# Patient Record
Sex: MALE | Race: WHITE | Employment: FULL TIME | URBAN - METROPOLITAN AREA
[De-identification: names, ages, dates, MRNs, and addresses within clinical notes are randomized per-mention and may not be internally consistent; named-entity substitution may affect disease eponyms.]

---

## 2018-12-06 ENCOUNTER — HOSPITAL ENCOUNTER (OUTPATIENT)
Age: 53
Discharge: HOME | End: 2018-12-06
Payer: COMMERCIAL

## 2018-12-06 LAB
ALBUMIN SERPL-MCNC: 4.2 G/DL (ref 3.5–5.2)
ALP BLD-CCNC: 82 U/L (ref 40–129)
ALT SERPL-CCNC: 21 U/L (ref 0–40)
ANION GAP SERPL CALCULATED.3IONS-SCNC: 11 MMOL/L (ref 7–16)
AST SERPL-CCNC: 35 U/L (ref 0–39)
BASOPHILS ABSOLUTE: 0.06 E9/L (ref 0–0.2)
BASOPHILS RELATIVE PERCENT: 0.9 % (ref 0–2)
BILIRUB SERPL-MCNC: 0.6 MG/DL (ref 0–1.2)
BILIRUBIN DIRECT: <0.2 MG/DL (ref 0–0.3)
BILIRUBIN, INDIRECT: NORMAL MG/DL (ref 0–1)
BUN BLDV-MCNC: 18 MG/DL (ref 6–20)
CALCIUM SERPL-MCNC: 9.5 MG/DL (ref 8.6–10.2)
CHLORIDE BLD-SCNC: 100 MMOL/L (ref 98–107)
CHOLESTEROL, FASTING: 184 MG/DL (ref 0–199)
CO2: 26 MMOL/L (ref 22–29)
CREAT SERPL-MCNC: 0.9 MG/DL (ref 0.7–1.2)
EOSINOPHILS ABSOLUTE: 0.3 E9/L (ref 0.05–0.5)
EOSINOPHILS RELATIVE PERCENT: 4.4 % (ref 0–6)
GFR AFRICAN AMERICAN: >60
GFR NON-AFRICAN AMERICAN: >60 ML/MIN/1.73
GLUCOSE FASTING: 97 MG/DL (ref 74–99)
HCT VFR BLD CALC: 44.3 % (ref 37–54)
HDLC SERPL-MCNC: 48 MG/DL
HEMOGLOBIN: 14.2 G/DL (ref 12.5–16.5)
IMMATURE GRANULOCYTES #: 0.02 E9/L
IMMATURE GRANULOCYTES %: 0.3 % (ref 0–5)
LDL CHOLESTEROL CALCULATED: 120 MG/DL (ref 0–99)
LYMPHOCYTES ABSOLUTE: 2.23 E9/L (ref 1.5–4)
LYMPHOCYTES RELATIVE PERCENT: 32.8 % (ref 20–42)
MCH RBC QN AUTO: 30.4 PG (ref 26–35)
MCHC RBC AUTO-ENTMCNC: 32.1 % (ref 32–34.5)
MCV RBC AUTO: 94.9 FL (ref 80–99.9)
MONOCYTES ABSOLUTE: 0.56 E9/L (ref 0.1–0.95)
MONOCYTES RELATIVE PERCENT: 8.2 % (ref 2–12)
NEUTROPHILS ABSOLUTE: 3.63 E9/L (ref 1.8–7.3)
NEUTROPHILS RELATIVE PERCENT: 53.4 % (ref 43–80)
PDW BLD-RTO: 12.5 FL (ref 11.5–15)
PLATELET # BLD: 276 E9/L (ref 130–450)
PMV BLD AUTO: 11.1 FL (ref 7–12)
POTASSIUM SERPL-SCNC: 5.2 MMOL/L (ref 3.5–5)
PROSTATE SPECIFIC ANTIGEN: 0.61 NG/ML (ref 0–4)
RBC # BLD: 4.67 E12/L (ref 3.8–5.8)
RHEUMATOID FACTOR: <10 IU/ML (ref 0–13)
SODIUM BLD-SCNC: 137 MMOL/L (ref 132–146)
TOTAL PROTEIN: 7.5 G/DL (ref 6.4–8.3)
TRIGLYCERIDE, FASTING: 79 MG/DL (ref 0–149)
TSH SERPL DL<=0.05 MIU/L-ACNC: 2.85 UIU/ML (ref 0.27–4.2)
URIC ACID, SERUM: 4.9 MG/DL (ref 3.4–7)
VLDLC SERPL CALC-MCNC: 16 MG/DL
WBC # BLD: 6.8 E9/L (ref 4.5–11.5)

## 2018-12-06 PROCEDURE — 84443 ASSAY THYROID STIM HORMONE: CPT

## 2018-12-06 PROCEDURE — 80061 LIPID PANEL: CPT

## 2018-12-06 PROCEDURE — 86431 RHEUMATOID FACTOR QUANT: CPT

## 2018-12-06 PROCEDURE — 36415 COLL VENOUS BLD VENIPUNCTURE: CPT

## 2018-12-06 PROCEDURE — G0103 PSA SCREENING: HCPCS

## 2018-12-06 PROCEDURE — 86038 ANTINUCLEAR ANTIBODIES: CPT

## 2018-12-06 PROCEDURE — 84550 ASSAY OF BLOOD/URIC ACID: CPT

## 2018-12-06 PROCEDURE — 80076 HEPATIC FUNCTION PANEL: CPT

## 2018-12-06 PROCEDURE — 85025 COMPLETE CBC W/AUTO DIFF WBC: CPT

## 2018-12-06 PROCEDURE — 80053 COMPREHEN METABOLIC PANEL: CPT

## 2018-12-07 LAB — ANTI-NUCLEAR ANTIBODY (ANA): NEGATIVE

## 2018-12-12 ENCOUNTER — TELEPHONE (OUTPATIENT)
Dept: ADMINISTRATIVE | Age: 53
End: 2018-12-12

## 2019-01-25 ENCOUNTER — OFFICE VISIT (OUTPATIENT)
Dept: CARDIOLOGY CLINIC | Age: 54
End: 2019-01-25
Payer: COMMERCIAL

## 2019-01-25 VITALS
HEIGHT: 70 IN | HEART RATE: 59 BPM | DIASTOLIC BLOOD PRESSURE: 70 MMHG | WEIGHT: 204 LBS | BODY MASS INDEX: 29.2 KG/M2 | RESPIRATION RATE: 16 BRPM | SYSTOLIC BLOOD PRESSURE: 112 MMHG

## 2019-01-25 DIAGNOSIS — R06.00 DYSPNEA, UNSPECIFIED TYPE: Primary | ICD-10-CM

## 2019-01-25 PROCEDURE — 99242 OFF/OP CONSLTJ NEW/EST SF 20: CPT | Performed by: INTERNAL MEDICINE

## 2019-01-25 PROCEDURE — 93000 ELECTROCARDIOGRAM COMPLETE: CPT | Performed by: INTERNAL MEDICINE

## 2019-01-25 RX ORDER — AZELASTINE 1 MG/ML
SPRAY, METERED NASAL
Refills: 0 | COMMUNITY
Start: 2018-12-07

## 2019-01-25 RX ORDER — ENALAPRIL MALEATE 5 MG/1
TABLET ORAL
Refills: 0 | COMMUNITY
Start: 2018-12-07

## 2019-01-25 RX ORDER — ALBUTEROL SULFATE 90 UG/1
2 AEROSOL, METERED RESPIRATORY (INHALATION) EVERY 6 HOURS PRN
COMMUNITY

## 2019-01-25 RX ORDER — CETIRIZINE HYDROCHLORIDE 10 MG/1
10 TABLET ORAL DAILY
COMMUNITY

## 2019-02-08 ENCOUNTER — HOSPITAL ENCOUNTER (OUTPATIENT)
Dept: CARDIOLOGY | Age: 54
Discharge: HOME | End: 2019-02-08
Payer: COMMERCIAL

## 2019-02-08 VITALS
HEIGHT: 71 IN | OXYGEN SATURATION: 97 % | SYSTOLIC BLOOD PRESSURE: 120 MMHG | HEART RATE: 79 BPM | BODY MASS INDEX: 28.42 KG/M2 | DIASTOLIC BLOOD PRESSURE: 70 MMHG | WEIGHT: 203 LBS

## 2019-02-08 DIAGNOSIS — R06.00 DYSPNEA, UNSPECIFIED TYPE: ICD-10-CM

## 2019-02-08 PROCEDURE — 93017 CV STRESS TEST TRACING ONLY: CPT

## 2019-02-13 ENCOUNTER — TELEPHONE (OUTPATIENT)
Dept: CARDIOLOGY CLINIC | Age: 54
End: 2019-02-13

## 2019-12-09 ENCOUNTER — HOSPITAL ENCOUNTER (OUTPATIENT)
Age: 54
Discharge: HOME | End: 2019-12-09
Payer: COMMERCIAL

## 2019-12-09 LAB
ALBUMIN SERPL-MCNC: 4.3 G/DL (ref 3.5–5.2)
ALP BLD-CCNC: 90 U/L (ref 40–129)
ALT SERPL-CCNC: 17 U/L (ref 0–40)
ANION GAP SERPL CALCULATED.3IONS-SCNC: 11 MMOL/L (ref 7–16)
AST SERPL-CCNC: 25 U/L (ref 0–39)
BASOPHILS ABSOLUTE: 0.04 E9/L (ref 0–0.2)
BASOPHILS RELATIVE PERCENT: 0.5 % (ref 0–2)
BILIRUB SERPL-MCNC: 0.5 MG/DL (ref 0–1.2)
BILIRUBIN DIRECT: <0.2 MG/DL (ref 0–0.3)
BILIRUBIN, INDIRECT: NORMAL MG/DL (ref 0–1)
BUN BLDV-MCNC: 12 MG/DL (ref 6–20)
CALCIUM SERPL-MCNC: 9.6 MG/DL (ref 8.6–10.2)
CHLORIDE BLD-SCNC: 98 MMOL/L (ref 98–107)
CHOLESTEROL, TOTAL: 193 MG/DL (ref 0–199)
CO2: 26 MMOL/L (ref 22–29)
CREAT SERPL-MCNC: 1 MG/DL (ref 0.7–1.2)
EOSINOPHILS ABSOLUTE: 0.12 E9/L (ref 0.05–0.5)
EOSINOPHILS RELATIVE PERCENT: 1.4 % (ref 0–6)
GFR AFRICAN AMERICAN: >60
GFR NON-AFRICAN AMERICAN: >60 ML/MIN/1.73
GLUCOSE BLD-MCNC: 96 MG/DL (ref 74–99)
HCT VFR BLD CALC: 47.3 % (ref 37–54)
HDLC SERPL-MCNC: 55 MG/DL
HEMOGLOBIN: 15 G/DL (ref 12.5–16.5)
IMMATURE GRANULOCYTES #: 0.03 E9/L
IMMATURE GRANULOCYTES %: 0.3 % (ref 0–5)
LDL CHOLESTEROL CALCULATED: 115 MG/DL (ref 0–99)
LYMPHOCYTES ABSOLUTE: 2.34 E9/L (ref 1.5–4)
LYMPHOCYTES RELATIVE PERCENT: 26.4 % (ref 20–42)
MCH RBC QN AUTO: 30.2 PG (ref 26–35)
MCHC RBC AUTO-ENTMCNC: 31.7 % (ref 32–34.5)
MCV RBC AUTO: 95.4 FL (ref 80–99.9)
MONOCYTES ABSOLUTE: 0.71 E9/L (ref 0.1–0.95)
MONOCYTES RELATIVE PERCENT: 8 % (ref 2–12)
NEUTROPHILS ABSOLUTE: 5.63 E9/L (ref 1.8–7.3)
NEUTROPHILS RELATIVE PERCENT: 63.4 % (ref 43–80)
PDW BLD-RTO: 12.6 FL (ref 11.5–15)
PLATELET # BLD: 255 E9/L (ref 130–450)
PMV BLD AUTO: 10 FL (ref 7–12)
POTASSIUM SERPL-SCNC: 4.4 MMOL/L (ref 3.5–5)
PROSTATE SPECIFIC ANTIGEN: 0.59 NG/ML (ref 0–4)
RBC # BLD: 4.96 E12/L (ref 3.8–5.8)
SODIUM BLD-SCNC: 135 MMOL/L (ref 132–146)
TOTAL PROTEIN: 7.9 G/DL (ref 6.4–8.3)
TRIGL SERPL-MCNC: 115 MG/DL (ref 0–149)
TSH SERPL DL<=0.05 MIU/L-ACNC: 2.62 UIU/ML (ref 0.27–4.2)
VLDLC SERPL CALC-MCNC: 23 MG/DL
WBC # BLD: 8.9 E9/L (ref 4.5–11.5)

## 2019-12-09 PROCEDURE — 82248 BILIRUBIN DIRECT: CPT

## 2019-12-09 PROCEDURE — 85025 COMPLETE CBC W/AUTO DIFF WBC: CPT

## 2019-12-09 PROCEDURE — G0103 PSA SCREENING: HCPCS

## 2019-12-09 PROCEDURE — 36415 COLL VENOUS BLD VENIPUNCTURE: CPT

## 2019-12-09 PROCEDURE — 80061 LIPID PANEL: CPT

## 2019-12-09 PROCEDURE — 80053 COMPREHEN METABOLIC PANEL: CPT

## 2019-12-09 PROCEDURE — 84443 ASSAY THYROID STIM HORMONE: CPT

## 2021-08-04 ENCOUNTER — HOSPITAL ENCOUNTER (OUTPATIENT)
Age: 56
Discharge: HOME | End: 2021-08-04
Payer: COMMERCIAL

## 2021-08-04 LAB
ALBUMIN SERPL-MCNC: 4.2 G/DL (ref 3.5–5.2)
ALP BLD-CCNC: 118 U/L (ref 40–129)
ALT SERPL-CCNC: 21 U/L (ref 0–40)
ANION GAP SERPL CALCULATED.3IONS-SCNC: 8 MMOL/L (ref 7–16)
AST SERPL-CCNC: 23 U/L (ref 0–39)
BILIRUB SERPL-MCNC: 0.6 MG/DL (ref 0–1.2)
BUN BLDV-MCNC: 15 MG/DL (ref 6–20)
CALCIUM SERPL-MCNC: 9.8 MG/DL (ref 8.6–10.2)
CHLORIDE BLD-SCNC: 98 MMOL/L (ref 98–107)
CHOLESTEROL, FASTING: 195 MG/DL (ref 0–199)
CO2: 28 MMOL/L (ref 22–29)
CREAT SERPL-MCNC: 1.3 MG/DL (ref 0.7–1.2)
GFR AFRICAN AMERICAN: >60
GFR NON-AFRICAN AMERICAN: 57 ML/MIN/1.73
GLUCOSE BLD-MCNC: 84 MG/DL (ref 74–99)
HCT VFR BLD CALC: 45.4 % (ref 37–54)
HDLC SERPL-MCNC: 59 MG/DL
HEMOGLOBIN: 14.4 G/DL (ref 12.5–16.5)
LDL CHOLESTEROL CALCULATED: 119 MG/DL (ref 0–99)
MCH RBC QN AUTO: 30.8 PG (ref 26–35)
MCHC RBC AUTO-ENTMCNC: 31.7 % (ref 32–34.5)
MCV RBC AUTO: 97 FL (ref 80–99.9)
PDW BLD-RTO: 13.3 FL (ref 11.5–15)
PLATELET # BLD: 231 E9/L (ref 130–450)
PMV BLD AUTO: 9.9 FL (ref 7–12)
POTASSIUM SERPL-SCNC: 5 MMOL/L (ref 3.5–5)
RBC # BLD: 4.68 E12/L (ref 3.8–5.8)
SODIUM BLD-SCNC: 134 MMOL/L (ref 132–146)
TOTAL PROTEIN: 7.8 G/DL (ref 6.4–8.3)
TRIGLYCERIDE, FASTING: 83 MG/DL (ref 0–149)
VLDLC SERPL CALC-MCNC: 17 MG/DL
WBC # BLD: 8.1 E9/L (ref 4.5–11.5)

## 2021-08-04 PROCEDURE — 80053 COMPREHEN METABOLIC PANEL: CPT

## 2021-08-04 PROCEDURE — 36415 COLL VENOUS BLD VENIPUNCTURE: CPT

## 2021-08-04 PROCEDURE — 85027 COMPLETE CBC AUTOMATED: CPT

## 2021-08-04 PROCEDURE — G0103 PSA SCREENING: HCPCS

## 2021-08-04 PROCEDURE — 80061 LIPID PANEL: CPT

## 2021-08-04 PROCEDURE — 84153 ASSAY OF PSA TOTAL: CPT

## 2021-08-12 LAB
Lab: NORMAL
REPORT: NORMAL
THIS TEST SENT TO: NORMAL

## 2021-09-28 ENCOUNTER — HOSPITAL ENCOUNTER (OUTPATIENT)
Age: 56
Discharge: HOME | End: 2021-09-30
Payer: COMMERCIAL

## 2021-09-28 ENCOUNTER — HOSPITAL ENCOUNTER (OUTPATIENT)
Dept: GENERAL RADIOLOGY | Age: 56
Discharge: HOME | End: 2021-09-30
Payer: COMMERCIAL

## 2021-09-28 DIAGNOSIS — R52 PAIN: ICD-10-CM

## 2021-09-28 PROCEDURE — 72070 X-RAY EXAM THORAC SPINE 2VWS: CPT

## 2022-07-02 ENCOUNTER — HOSPITAL ENCOUNTER (EMERGENCY)
Age: 57
Discharge: HOME | End: 2022-07-02
Payer: COMMERCIAL

## 2022-07-02 VITALS
TEMPERATURE: 97.6 F | OXYGEN SATURATION: 100 % | BODY MASS INDEX: 28.7 KG/M2 | DIASTOLIC BLOOD PRESSURE: 100 MMHG | WEIGHT: 205 LBS | RESPIRATION RATE: 14 BRPM | HEIGHT: 71 IN | SYSTOLIC BLOOD PRESSURE: 127 MMHG | HEART RATE: 56 BPM
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
## 2022-07-02 DIAGNOSIS — S10.96XA TICK BITE OF NECK, INITIAL ENCOUNTER: Primary | ICD-10-CM

## 2022-07-02 PROCEDURE — 99283 EMERGENCY DEPT VISIT LOW MDM: CPT

## 2022-07-02 PROCEDURE — 6370000000 HC RX 637 (ALT 250 FOR IP): Performed by: PHYSICIAN ASSISTANT

## 2022-07-02 RX ORDER — DOXYCYCLINE HYCLATE 100 MG/1
200 CAPSULE ORAL ONCE
Status: COMPLETED | OUTPATIENT
Start: 2022-07-02 | End: 2022-07-02

## 2022-07-02 RX ADMIN — DOXYCYCLINE HYCLATE 200 MG: 100 CAPSULE ORAL at 23:37

## 2022-07-03 NOTE — ED PROVIDER NOTES
[address] [place] [address]  Department of Emergency Medicine   ED  Encounter Note  Admit Date/RoomTime: 2022 11:24 PM  ED Room:   NAME: Jesús Mckeon  : 1965  MRN: [id number]     Chief Complaint:  Tick Removal (patient states that he pulled tick out of beard)    [address] [facility] Conchis Hess is a 62 y.o. male who presents to the ED by private vehicle for tick removed from neck, beginning a couple hours ago. The complaint has been stable and are mild in severity. He reports the tick was not there last night. He has no pain at the site. Pt has tick with him. It is a dog tick. ROS   Pertinent positives and negatives are stated within HPI, all other systems reviewed and are negative. Past Medical History:  has a past medical history of Asthma and Hypertension. Surgical History:  has a past surgical history that includes [place] tooth extraction. Social History:  reports that he has never smoked. He has never used smokeless tobacco. He reports that he does not drink alcohol and does not use drugs. Family History: family history includes Diabetes (age of onset: 66) in his father; Heart Disease (age of onset: 48) in his maternal uncle; Other in his father; Other (age of onset: 80) in his mother. Allergies: Seasonal    PHYSICAL EXAM   Oxygen Saturation Interpretation: Normal on room air analysis. ED Triage Vitals [22 2254]   BP Temp Temp Source Heart Rate Resp SpO2 Height Weight   (!) 127/100 97.6 °F (36.4 °C) Tympanic 56 14 100 % 5' 11\" (1.803 m) 205 lb (93 kg)         Physical Exam  Constitutional/General: Alert and oriented x3, well appearing, non toxic  HEENT:  NC/NT. PERRLA,  Airway patent. Neck: Supple, full ROM, non tender to palpation in the midline, site of tick bite is a 2 mm red spot, no swelling, drainage, or pain with palpation. It is located where under side of jaw meets neck, midline.   Musculoskeletal: Moves all

## 2025-03-12 ENCOUNTER — TRANSCRIBE ORDERS (OUTPATIENT)
Dept: ADMINISTRATIVE | Age: 60
End: 2025-03-12

## 2025-03-12 DIAGNOSIS — M25.311 INSTABILITY OF RIGHT SHOULDER JOINT: ICD-10-CM

## 2025-03-12 DIAGNOSIS — M75.121 NONTRAUMATIC COMPLETE TEAR OF ROTATOR CUFF, RIGHT: Primary | ICD-10-CM

## 2025-04-02 ENCOUNTER — HOSPITAL ENCOUNTER (OUTPATIENT)
Dept: MRI IMAGING | Age: 60
Discharge: HOME | End: 2025-04-04
Payer: MEDICARE

## 2025-04-02 DIAGNOSIS — M75.121 NONTRAUMATIC COMPLETE TEAR OF ROTATOR CUFF, RIGHT: ICD-10-CM

## 2025-04-02 DIAGNOSIS — M25.311 INSTABILITY OF RIGHT SHOULDER JOINT: ICD-10-CM

## 2025-04-02 PROCEDURE — 73221 MRI JOINT UPR EXTREM W/O DYE: CPT
